# Patient Record
(demographics unavailable — no encounter records)

---

## 2025-03-21 NOTE — HISTORY OF PRESENT ILLNESS
[de-identified] : 62 F presents with left calf pain and wrist pain  that began a month ago when she was hiking in [place] and stepped into deep snow and hurt her foot being compressed by the frozen top layer of snow She had bruising about her calf at that time however this resolved with elevation for the most part She states  that a few days ago she was walking her dog and had sevre pain in her posterior calf. She does not have any pain currently but feels that if she goes for a long walki it may come back regarding her right wrist she endorses about 3 months of right wrist pain Denies recent trauma, though she has a history of right 4-5th metacarpal fracture treated with ORIF 8 years ago Pain is primaril about right radial side of wrist. SHe had an injection for this on her left wrist in the past with good relief. She does not normally take pain medication but sometimes takes alieve

## 2025-03-21 NOTE — PROCEDURE
[de-identified] : Injection: Right wrist. Indication: De Quervain's tenosynovitis.  A discussion was had with the patient regarding this procedure and all questions were answered. All risks, benefits and alternatives were discussed. These included but were not limited to bleeding, infection, and allergic reaction. A timeout was done to ensure correct side and patient agreed to the procedure.  A [place] gil was created on the skin utilizing a plastic needle cap to gil the anticipated point of entry.   Alcohol was used to clean the skin, and Betadine was used to sterilize and prep the area over the radial aspect of the wrist overlying the first extensor tendon compartment encompassing the APL and EPB.  Ethyl chloride spray was then used as a topical anesthetic. A 25-gauge needle was used to inject 0.5cc of 0.25% bupivacaine and 1cc of 6mg/mL betamethasone into the first extensor compartment. A sterile bandage was then applied. The patient tolerated the procedure well and there were no complications.

## 2025-03-21 NOTE — DISCUSSION/SUMMARY
[de-identified] : Discussed findings of today's exam and possible causes of patient's pain.  Educated patient on their most probable diagnosis of Right wrist De Quervain's tenosynovitis.  Reviewed possible courses of treatment, and we collaboratively decided best course of treatment at this time will include cortisone injection today (see procedure note).  Informed the patient that the numbing medicine in today's injection will last for about 4-6 hours. The steroid that was injected will start to work in 1 to 2 days, peak at 1-2 weeks, and may last up to 4-6 weeks. Discussed with the patient the expected course of this injury, and the variable response to cortisone injections. Sometimes it is relieved with a single injection, while others require repeat injections.  We will wait and see how patient responds to this one cortisone injection, may consider repeat injection in 6 weeks if issue is improved but not fully resolved. Patient may consider consultation with hand/wrist surgeon to have a discussion about repeat cortisone injections, versus possible surgical intervention.  Patient advised to wear a thumb spica splint every evening and as tolerate during the day.  Based on the patient's symptoms today, no hand/wrist therapy will likely be needed.    Patient was also seen today for evaluation management of left calf pain due to mild grade 1 strain of the medial gastrocnemius.  Patient has no significant spasm at this time.  She originally injured this when she was hiking about 1 month ago, she had a mild flareup recently when walking her dog.  At this time patient has no evidence of any high-grade myofascial tear or injury, she already has full range of motion and full strength.  We discussed appropriate activity modification, this injury should resolve over the next 1-2 weeks.  If it does not would recommend a course of physical therapy at that time. Make a follow-up as needed.  Patient appreciates and agrees with current plan.  This note was generated using dragon medical dictation software.  A reasonable effort has been made for proofreading its contents, but typos may still remain.  If there are any questions or points of clarification needed please notify my office.

## 2025-03-21 NOTE — PHYSICAL EXAM
[de-identified] : Constitutional: Well-nourished, well-developed, No acute distress Respiratory:  Good respiratory effort, no SOB Psychiatric: Pleasant and normal affect, alert and oriented x3 Musculoskeletal: normal except where as noted in regional exam  Right Wrist: APPEARANCE: no marked deformities, no swelling or malalignment POSITIVE TENDERNESS: radial wrist and thumb along compartment of APL and EPB NONTENDER: snuffbox, scapholunate, TFCC, FCU/FCR, ECU/ECRL/ECRB, radial styloid, CMC, MCP.  ROM: full although painful in ulnar deviation, and thumb flexion, otherwise painless.  RESISTIVE TESTING: Painful resisted radial deviation, thumb extension, and thumb abduction, mild pain with wrist ext, painless resisted flex and ulnar deviation.  SPECIAL TESTS: + Finkelstein's. neg Phalen's. neg reverse Phalen's. neg Maldonado's. neg shawna test. neg TFCC grind. neg tinel's at the carpal tunnel Vasc: 2+ radial pulse Neuro: AIN, PIN, Ulnar nerve intact to motor Sensation: Intact to light touch throughout  Left Lower Leg: APPEARANCE: no marked deformities, no swelling or malalignment POSITIVE TENDERNESS: mild at midportion of gastrocnemius at the medial musculotendinous junction NONTENDER: Tibiofemoral jt lines b/l, patellar & quadriceps tendons, Medial and lateral tibial plateua, tibial tuberosity, pes bursa, proximal fibular head, medial/anterior tibial shaft, fibula shaft, medial/lateral malleoli, anterior/posterior tibialis, peroneals, achilles ROM: full & painless in the knee and ankle, no pain with rotation of the leg.  RESISTIVE TESTING: painless resisted knee flex/ext, Ankle DF/PF/Inversion/Eversion.  SPECIAL TESTS: neg squeeze test, neg calcaneal bump test, neg Aguilar test